# Patient Record
Sex: MALE | Race: WHITE | NOT HISPANIC OR LATINO | Employment: FULL TIME | URBAN - METROPOLITAN AREA
[De-identification: names, ages, dates, MRNs, and addresses within clinical notes are randomized per-mention and may not be internally consistent; named-entity substitution may affect disease eponyms.]

---

## 2024-06-05 ENCOUNTER — OFFICE VISIT (OUTPATIENT)
Dept: CARDIOLOGY | Facility: CLINIC | Age: 44
End: 2024-06-05
Payer: COMMERCIAL

## 2024-06-05 VITALS
SYSTOLIC BLOOD PRESSURE: 122 MMHG | OXYGEN SATURATION: 96 % | BODY MASS INDEX: 33.92 KG/M2 | HEART RATE: 69 BPM | WEIGHT: 272.8 LBS | HEIGHT: 75 IN | DIASTOLIC BLOOD PRESSURE: 88 MMHG

## 2024-06-05 DIAGNOSIS — I45.4 IVCD (INTRAVENTRICULAR CONDUCTION DEFECT): ICD-10-CM

## 2024-06-05 DIAGNOSIS — R94.31 ABNORMAL EKG: ICD-10-CM

## 2024-06-05 DIAGNOSIS — R07.2 PRECORDIAL PAIN: Primary | ICD-10-CM

## 2024-06-05 PROCEDURE — 99204 OFFICE O/P NEW MOD 45 MIN: CPT | Performed by: INTERNAL MEDICINE

## 2024-06-05 RX ORDER — FAMOTIDINE 10 MG
10 TABLET ORAL 2 TIMES DAILY
COMMUNITY

## 2024-06-05 NOTE — PROGRESS NOTES
"      CARDIOLOGY    Maggi Shen MD    ENCOUNTER DATE:  06/05/2024    Lalo Varghese / 43 y.o. / male        CHIEF COMPLAINT / REASON FOR OFFICE VISIT     Chest Pain      HISTORY OF PRESENT ILLNESS       HPI    Lalo Varghese is a 43 y.o. male     This gentleman says that he has a history of panic attacks. No diabetes, hypertension, or hyperlipidemia. He was driving and not feeling particularly anxious when he started to feel like his heart was fluttering and racing. It felt tight in his chest and neck. No shortness of breath. It lasted about 1-2 minutes. He says he has had about 2-3 of these episodes in 5 years. He wonders if it might be due to not getting his Klonopin filled at the right time. He said that he had been taking a reduced dose and then had been off of it for about a week when he had this event.         REVIEW OF SYSTEMS     ROS      VITAL SIGNS     Visit Vitals  /88   Pulse 69   Ht 190.5 cm (75\")   Wt 124 kg (272 lb 12.8 oz)   SpO2 96%   BMI 34.10 kg/m²         Wt Readings from Last 3 Encounters:   06/05/24 124 kg (272 lb 12.8 oz)   05/20/24 125 kg (275 lb)   04/10/24 127 kg (280 lb)     Body mass index is 34.1 kg/m².      PHYSICAL EXAMINATION     Physical Exam      REVIEWED DATA     Procedures          Lab Results   Component Value Date    BUN 8 07/20/2021    CREATININE 0.9 07/20/2021    K 3.5 07/20/2021    CO2 26 07/20/2021    CALCIUM 9.1 07/20/2021    ALBUMIN 4.4 07/20/2021    BILITOT 1.09 (H) 07/20/2021    AST 19 07/20/2021    ALT 31 07/20/2021       ASSESSMENT & PLAN      Diagnosis Plan   1. Precordial pain  Adult Stress Echo W/ Cont or Stress Agent if Necessary Per Protocol      2. Abnormal EKG  Adult Stress Echo W/ Cont or Stress Agent if Necessary Per Protocol      3. IVCD (intraventricular conduction defect)  Adult Stress Echo W/ Cont or Stress Agent if Necessary Per Protocol          Palpitations associated with chest tightness. I wonder if he had a burst of SVT. The issue is that " these happen so infrequently that I do not think we could catch it even with a monthly monitor unless they became more frequent.   Dyspnea on exertion. I recommend checking a stress echocardiogram to evaluate his heart structure to make sure that is not an issue with the above palpitations and also to exclude any ischemia. He is a long time smoker.  Abnormal EKG with nonspecific interventricular conduction delay and also possible hypertension. I think it is going to be important to have the stress echocardiogram component given his baseline abnormal EKG.    One of my nurses or I will go over the results of the stress echocardiogram when it becomes available and determine if he needs to followup here routinely or as needed.         Orders Placed This Encounter   Procedures    Adult Stress Echo W/ Cont or Stress Agent if Necessary Per Protocol     Standing Status:   Future     Standing Expiration Date:   6/5/2025     Order Specific Question:   What stress agent will be used?     Answer:   Exercise with Possible [employer]     Order Specific Question:   Reason for exam?     Answer:   Chest Pain     Order Specific Question:   Release to patient     Answer:   Routine Release [[id number]]           MEDICATIONS         Discharge Medications            Accurate as of June 5, 2024 10:11 AM. If you have any questions, ask your nurse or doctor.                Continue These Medications        Instructions Start Date   clonazePAM 1 MG tablet  Commonly known as: KlonoPIN   1 mg, Oral, Every 12 Hours PRN      famotidine 10 MG tablet  Commonly known as: PEPCID   10 mg, Oral, 2 Times Daily      fluticasone 50 MCG/ACT nasal spray  Commonly known as: FLONASE   2 sprays, Nasal, Daily      HYDROcodone-acetaminophen 7.5-325 MG per tablet  Commonly known as: NORCO   TAKE 1 TABLET BY MOUTH 4 TIMES DAILY AS NEEDED      ibuprofen 200 MG tablet  Commonly known as: ADVIL,MOTRIN   400 mg, Oral             Stop These Medications       esomeprazole 20 MG packet  Commonly known as: NexIUM  Stopped by: MD Maggi Og MD  06/05/24  10:11 EDT    Part of this note may be an electronic transcription/translation of spoken language to printed text using the Dragon dictation system.

## 2024-06-12 ENCOUNTER — OFFICE VISIT (OUTPATIENT)
Dept: FAMILY MEDICINE CLINIC | Facility: CLINIC | Age: 44
End: 2024-06-12
Payer: COMMERCIAL

## 2024-06-12 ENCOUNTER — PATIENT ROUNDING (BHMG ONLY) (OUTPATIENT)
Dept: FAMILY MEDICINE CLINIC | Facility: CLINIC | Age: 44
End: 2024-06-12
Payer: COMMERCIAL

## 2024-06-12 VITALS
WEIGHT: 272.6 LBS | HEART RATE: 70 BPM | HEIGHT: 75 IN | BODY MASS INDEX: 33.89 KG/M2 | DIASTOLIC BLOOD PRESSURE: 76 MMHG | OXYGEN SATURATION: 96 % | SYSTOLIC BLOOD PRESSURE: 108 MMHG

## 2024-06-12 DIAGNOSIS — F41.9 ANXIETY: Primary | ICD-10-CM

## 2024-06-12 PROCEDURE — 99213 OFFICE O/P EST LOW 20 MIN: CPT | Performed by: NURSE PRACTITIONER

## 2024-06-12 NOTE — PROGRESS NOTES
How did you hear about our practice/providers?  IMMEDIATE CARE     Tell me about your visit with us. What things went well?    EVERYTHING WENT VERY WELL     We're always looking for ways to make our patients' experiences even better. Do you have recommendations on ways we may improve?  NOTHING TO IMPROVE ON    Overall were you satisfied with your first visit to our practice?  YES     Is there anything else I can do for you?  NO    You may receive a survey from [employer] via text or email to provide feedback about your visit.  We ask that you please take a few minutes to complete the survey and let us know how we are doing.  If for any reason you feel unable to give us the highest rating please let me know.   PT INFORMED

## 2024-06-12 NOTE — PROGRESS NOTES
"Chief Complaint  Establish Care (Would like medication for anxiety, back pain meds goes to the pain clinic. Current doctor does not fill his meds in a timely manner. )    Subjective        Lalo Varghese presents to [facility]  History of Present Illness    Patient is here today to establish care as a new patient. He wasn't previous with Primary care provider.      [facility]-manages pain medication-hydrocodone usually takes three times daily    Anxiety: Urgent care with [facility]-prescribed clonazepam.  1mg twice daily-only takes once daily. Does save them because   Previous xanax, zoloft, prozac, wellbutrin     GERD-famotidine-otc    Objective   Vital Signs:  /76   Pulse 70   Ht 190.5 cm (75\")   Wt 124 kg (272 lb 9.6 oz)   SpO2 96%   BMI 34.07 kg/m²   Estimated body mass index is 34.07 kg/m² as calculated from the following:    Height as of this encounter: 190.5 cm (75\").    Weight as of this encounter: 124 kg (272 lb 9.6 oz).             Physical Exam  Constitutional:       Appearance: Normal appearance.   Cardiovascular:      Rate and Rhythm: Normal rate and regular rhythm.   Pulmonary:      Effort: Pulmonary effort is normal.      Breath sounds: Normal breath sounds.   Neurological:      Mental Status: He is alert and oriented to person, place, and time.   Psychiatric:         Mood and Affect: Mood normal.         Behavior: Behavior normal.         Thought Content: Thought content normal.         Judgment: Judgment normal.        Result Review :                     Assessment and Plan     Diagnoses and all orders for this visit:    1. Anxiety (Primary)  -     [facility] - Swab,    Continue to see pain management    I discussed with patient at length that hydrocodone and clonazepam were not safe to take together and I would like to consider a different medication for anxiety.  Because he has tried several medications we would like to proceed with " Adonit today will call with results.  He is going to start weaning protocol and will go down to 0.5mg diazepam daily for 2 week and then 0.25 mg for 2 weeks and then stop.  Discussed I will give him 1 fill for this but we will not continue this long-term.  He verbalized understanding and is agreeable    Will follow-up in 6 weeks once he starts on new medication.  If he has any side effects or issues with medication please notify me sooner.  If any suicidal homicidal ideations go to the ER.  He verbalizes understanding and is agreeable         Follow Up     Return in about 6 weeks (around 7/24/2024) for Annual physical, follow up on anxiety.  Patient was given instructions and counseling regarding his condition or for health maintenance advice. Please see specific information pulled into the AVS if appropriate.

## 2024-06-17 ENCOUNTER — TELEPHONE (OUTPATIENT)
Dept: FAMILY MEDICINE CLINIC | Facility: CLINIC | Age: 44
End: 2024-06-17
Payer: COMMERCIAL

## 2024-06-17 DIAGNOSIS — F41.9 ANXIETY: Primary | ICD-10-CM

## 2024-06-17 RX ORDER — CLONAZEPAM 0.5 MG/1
TABLET ORAL
Qty: 14 TABLET | Refills: 0 | Status: SHIPPED | OUTPATIENT
Start: 2024-06-17 | End: 2024-07-01

## 2024-06-17 RX ORDER — VILAZODONE HYDROCHLORIDE 20 MG/1
20 TABLET ORAL DAILY
Qty: 30 TABLET | Refills: 2 | Status: SHIPPED | OUTPATIENT
Start: 2024-06-17

## 2024-06-17 NOTE — TELEPHONE ENCOUNTER
Yes.  As we discussed in office he is supposed to be taking half a milligram once a day of the clonazepam and I am going to send in the lower dose for weaning.  Sending in viibryd for trial.  If any issues notify provider.  Keep follow up

## 2024-06-17 NOTE — TELEPHONE ENCOUNTER
Spoke to pt and he is stating that he will try whatever you think is best.  He is also wanting to make sure that you are weaning him off the clonzapam and he is not stopping cold turkey

## 2024-06-17 NOTE — TELEPHONE ENCOUNTER
Please call patient with results of [facility] testing.  Let him know that medications on use as directed list are Wellbutrin, Pristiq, Fetzima, emsam, buspar and Viibryd.  I believe a good next step for trial for anxiety would be Pristiq, Viibryd, or BuSpar.  Please see if he has a preference on which 1 he would like to start and I will send in and we will follow-up at his already scheduled follow-up.  If he would like to discuss further these options he can make an appointment via telehealth for us to discuss.

## 2024-06-19 ENCOUNTER — TELEPHONE (OUTPATIENT)
Dept: CARDIOLOGY | Facility: CLINIC | Age: 44
End: 2024-06-19
Payer: COMMERCIAL

## 2024-06-21 ENCOUNTER — TELEPHONE (OUTPATIENT)
Dept: CARDIOLOGY | Facility: CLINIC | Age: 44
End: 2024-06-21
Payer: COMMERCIAL

## 2024-06-24 ENCOUNTER — HOSPITAL ENCOUNTER (OUTPATIENT)
Dept: CARDIOLOGY | Facility: HOSPITAL | Age: 44
Discharge: HOME | End: 2024-06-24
Admitting: INTERNAL MEDICINE
Payer: COMMERCIAL

## 2024-06-24 DIAGNOSIS — I45.4 IVCD (INTRAVENTRICULAR CONDUCTION DEFECT): ICD-10-CM

## 2024-06-24 DIAGNOSIS — R94.31 ABNORMAL EKG: ICD-10-CM

## 2024-06-24 DIAGNOSIS — R07.2 PRECORDIAL PAIN: ICD-10-CM

## 2024-06-24 LAB
AORTIC ARCH: 2.8 CM
AORTIC DIMENSIONLESS INDEX: 0.6 (DI)
ASCENDING AORTA: 2.8 CM
BH CV ECHO MEAS - ACS: 2.7 CM
BH CV ECHO MEAS - AO MAX PG: 5.9 MMHG
BH CV ECHO MEAS - AO MEAN PG: 3.3 MMHG
BH CV ECHO MEAS - AO ROOT DIAM: 3.9 CM
BH CV ECHO MEAS - AO V2 MAX: 121.8 CM/SEC
BH CV ECHO MEAS - AO V2 VTI: 27 CM
BH CV ECHO MEAS - AVA(I,D): 2.9 CM2
BH CV ECHO MEAS - EDV(CUBED): 171.1 ML
BH CV ECHO MEAS - EDV(MOD-SP2): 147 ML
BH CV ECHO MEAS - EDV(MOD-SP4): 122 ML
BH CV ECHO MEAS - EF(MOD-BP): 60.9 %
BH CV ECHO MEAS - EF(MOD-SP2): 61.2 %
BH CV ECHO MEAS - EF(MOD-SP4): 59.8 %
BH CV ECHO MEAS - ESV(CUBED): 41.9 ML
BH CV ECHO MEAS - ESV(MOD-SP2): 57 ML
BH CV ECHO MEAS - ESV(MOD-SP4): 49 ML
BH CV ECHO MEAS - FS: 37.4 %
BH CV ECHO MEAS - IVS/LVPW: 1.04 CM
BH CV ECHO MEAS - IVSD: 1.12 CM
BH CV ECHO MEAS - LAT PEAK E' VEL: 8.9 CM/SEC
BH CV ECHO MEAS - LV DIASTOLIC VOL/BSA (35-75): 48.8 CM2
BH CV ECHO MEAS - LV MASS(C)D: 243.6 GRAMS
BH CV ECHO MEAS - LV MAX PG: 2.9 MMHG
BH CV ECHO MEAS - LV MEAN PG: 1.58 MMHG
BH CV ECHO MEAS - LV SYSTOLIC VOL/BSA (12-30): 19.6 CM2
BH CV ECHO MEAS - LV V1 MAX: 85.7 CM/SEC
BH CV ECHO MEAS - LV V1 VTI: 16.9 CM
BH CV ECHO MEAS - LVIDD: 5.6 CM
BH CV ECHO MEAS - LVIDS: 3.5 CM
BH CV ECHO MEAS - LVOT AREA: 4.6 CM2
BH CV ECHO MEAS - LVOT DIAM: 2.43 CM
BH CV ECHO MEAS - LVPWD: 1.07 CM
BH CV ECHO MEAS - MED PEAK E' VEL: 6.5 CM/SEC
BH CV ECHO MEAS - MV A DUR: 0.12 SEC
BH CV ECHO MEAS - MV A MAX VEL: 54.5 CM/SEC
BH CV ECHO MEAS - MV DEC SLOPE: 193.5 CM/SEC2
BH CV ECHO MEAS - MV DEC TIME: 0.22 SEC
BH CV ECHO MEAS - MV E MAX VEL: 48.4 CM/SEC
BH CV ECHO MEAS - MV E/A: 0.89
BH CV ECHO MEAS - MV MAX PG: 1.74 MMHG
BH CV ECHO MEAS - MV MEAN PG: 0.84 MMHG
BH CV ECHO MEAS - MV P1/2T: 97.3 MSEC
BH CV ECHO MEAS - MV V2 VTI: 27.7 CM
BH CV ECHO MEAS - MVA(P1/2T): 2.26 CM2
BH CV ECHO MEAS - MVA(VTI): 2.8 CM2
BH CV ECHO MEAS - PA ACC TIME: 0.15 SEC
BH CV ECHO MEAS - PA V2 MAX: 97.3 CM/SEC
BH CV ECHO MEAS - PULM A REVS DUR: 0.13 SEC
BH CV ECHO MEAS - PULM A REVS VEL: 39.2 CM/SEC
BH CV ECHO MEAS - PULM DIAS VEL: 53.2 CM/SEC
BH CV ECHO MEAS - PULM S/D: 1.1
BH CV ECHO MEAS - PULM SYS VEL: 58.6 CM/SEC
BH CV ECHO MEAS - RAP SYSTOLE: 3 MMHG
BH CV ECHO MEAS - RV MAX PG: 1.59 MMHG
BH CV ECHO MEAS - RV V1 MAX: 63 CM/SEC
BH CV ECHO MEAS - RV V1 VTI: 16.4 CM
BH CV ECHO MEAS - SV(LVOT): 78.4 ML
BH CV ECHO MEAS - SV(MOD-SP2): 90 ML
BH CV ECHO MEAS - SV(MOD-SP4): 73 ML
BH CV ECHO MEAS - SVI(LVOT): 31.4 ML/M2
BH CV ECHO MEAS - SVI(MOD-SP2): 36 ML/M2
BH CV ECHO MEAS - SVI(MOD-SP4): 29.2 ML/M2
BH CV ECHO MEAS - TAPSE (>1.6): 2.44 CM
BH CV ECHO MEASUREMENTS AVERAGE E/E' RATIO: 6.29
BH CV STRESS BP STAGE 1: NORMAL
BH CV STRESS BP STAGE 2: NORMAL
BH CV STRESS BP STAGE 3: NORMAL
BH CV STRESS DURATION MIN STAGE 1: 3
BH CV STRESS DURATION MIN STAGE 2: 3
BH CV STRESS DURATION MIN STAGE 3: 3
BH CV STRESS DURATION MIN STAGE 4: 1
BH CV STRESS DURATION SEC STAGE 1: 0
BH CV STRESS DURATION SEC STAGE 2: 0
BH CV STRESS DURATION SEC STAGE 3: 0
BH CV STRESS DURATION SEC STAGE 4: 30
BH CV STRESS ECHO POST STRESS EJECTION FRACTION EF: 66 %
BH CV STRESS GRADE STAGE 1: 10
BH CV STRESS GRADE STAGE 2: 12
BH CV STRESS GRADE STAGE 3: 14
BH CV STRESS GRADE STAGE 4: 16
BH CV STRESS HR STAGE 1: 98
BH CV STRESS HR STAGE 2: 115
BH CV STRESS HR STAGE 3: 140
BH CV STRESS HR STAGE 4: 156
BH CV STRESS METS STAGE 1: 5
BH CV STRESS METS STAGE 2: 7.5
BH CV STRESS METS STAGE 3: 10
BH CV STRESS METS STAGE 4: 12
BH CV STRESS PROTOCOL 1: NORMAL
BH CV STRESS SPEED STAGE 1: 1.7
BH CV STRESS SPEED STAGE 2: 2.5
BH CV STRESS SPEED STAGE 3: 3.4
BH CV STRESS SPEED STAGE 4: 4.2
BH CV STRESS STAGE 1: 1
BH CV STRESS STAGE 2: 2
BH CV STRESS STAGE 3: 3
BH CV STRESS STAGE 4: 4
BH CV XLRA - TDI S': 12.5 CM/SEC
LEFT ATRIUM VOLUME INDEX: 23.6 ML/M2
MAXIMAL PREDICTED HEART RATE: 175 BPM
SINUS: 3.2 CM
STJ: 2.6 CM
STRESS TARGET HR: 149 BPM

## 2024-06-24 PROCEDURE — 93350 STRESS TTE ONLY: CPT

## 2024-06-24 PROCEDURE — 93350 STRESS TTE ONLY: CPT | Performed by: INTERNAL MEDICINE

## 2024-06-24 PROCEDURE — 93320 DOPPLER ECHO COMPLETE: CPT | Performed by: INTERNAL MEDICINE

## 2024-06-24 PROCEDURE — 93320 DOPPLER ECHO COMPLETE: CPT

## 2024-06-24 PROCEDURE — 93325 DOPPLER ECHO COLOR FLOW MAPG: CPT | Performed by: INTERNAL MEDICINE

## 2024-06-24 PROCEDURE — 93016 CV STRESS TEST SUPVJ ONLY: CPT | Performed by: INTERNAL MEDICINE

## 2024-06-24 PROCEDURE — 93017 CV STRESS TEST TRACING ONLY: CPT

## 2024-06-24 PROCEDURE — 93325 DOPPLER ECHO COLOR FLOW MAPG: CPT

## 2024-06-24 PROCEDURE — 93018 CV STRESS TEST I&R ONLY: CPT | Performed by: INTERNAL MEDICINE

## 2024-06-25 ENCOUNTER — TELEPHONE (OUTPATIENT)
Dept: CARDIOLOGY | Facility: CLINIC | Age: 44
End: 2024-06-25
Payer: COMMERCIAL

## 2024-06-25 NOTE — TELEPHONE ENCOUNTER
Results and recommendations called to pt.  Instructed to call with any further questions or concerns.  Verbalized understanding.  Pt states that he is not having any symptoms at this time, that he will follow up with PCP, and call our office if symptoms return or worsen.    Josie Carlin RN  [place] Nurse, [facility] [place]  06/25/24 10:10 EDT

## 2024-06-25 NOTE — TELEPHONE ENCOUNTER
STRESS ECHO  ----- Message from Maggi Shen sent at 6/25/2024  8:28 AM EDT -----  Because of the lack of contrast, not all walls of the heart were well visualized and  to an inconclusive stress test. If he is still having symptoms, I recommend repeating just the stress portion of a stress echo with contrast or a nuclear stress test. If all is normal, he does not need to follow up with me but with his PCP unless his symptoms change or get worse.    JL  ----- Message -----  From: Debbie Milian RN  Sent: 6/25/2024   8:10 AM EDT  To: MD Dr. Hermelinda Armendariz, before I call this patient when would you like for them to FU?     Debbie Milian RN  [place] [facility] [place]

## 2024-06-25 NOTE — TELEPHONE ENCOUNTER
Called and left VM. Will continue to try to reach patient. HUB transfer call to triage.     Debbie Milian RN  Triage [facility] [place]

## 2024-06-27 ENCOUNTER — TELEPHONE (OUTPATIENT)
Dept: FAMILY MEDICINE CLINIC | Facility: CLINIC | Age: 44
End: 2024-06-27

## 2024-06-27 NOTE — TELEPHONE ENCOUNTER
Spoke with pt and he is wanting to take a few weeks off work to help him deal with the changes from changing his anxiety medication.  He is really struggling.  He has talked to the managers and are getting the paperwork ready for him to , he will drop them off once he gets them.  He stated it is some type of plan at work where he can pay into and take off when he needs it.

## 2024-06-27 NOTE — TELEPHONE ENCOUNTER
Caller: Lalo Varghese    Relationship: Self    Best call back number: [phone]     Who are you requesting to speak with (clinical staff, provider,  specific staff member): SARAH CASTORENA    What was the call regarding: PATIENT STATES THAT HE WAS RECENTLY TAKEN OFF HIS ANXIETY MEDICATION, AND HAS BEEN STRUGGLING WITH HIS ANXIETY THE LAST FEW DAYS. REQUESTS CALL BACK TO DISCUSS FURTHER CONCERNS

## 2024-07-05 ENCOUNTER — OFFICE VISIT (OUTPATIENT)
Dept: FAMILY MEDICINE CLINIC | Facility: CLINIC | Age: 44
End: 2024-07-05
Payer: COMMERCIAL

## 2024-07-05 VITALS
OXYGEN SATURATION: 99 % | HEIGHT: 75 IN | SYSTOLIC BLOOD PRESSURE: 118 MMHG | BODY MASS INDEX: 33.99 KG/M2 | WEIGHT: 273.4 LBS | HEART RATE: 59 BPM | DIASTOLIC BLOOD PRESSURE: 76 MMHG

## 2024-07-05 DIAGNOSIS — F41.9 ANXIETY: Primary | ICD-10-CM

## 2024-07-05 PROCEDURE — 99214 OFFICE O/P EST MOD 30 MIN: CPT | Performed by: NURSE PRACTITIONER

## 2024-07-05 RX ORDER — HYDROXYZINE HYDROCHLORIDE 25 MG/1
25-50 TABLET, FILM COATED ORAL 3 TIMES DAILY PRN
Qty: 30 TABLET | Refills: 2 | Status: SHIPPED | OUTPATIENT
Start: 2024-07-05

## 2024-07-05 RX ORDER — VILAZODONE HYDROCHLORIDE 40 MG/1
40 TABLET ORAL DAILY
Qty: 30 TABLET | Refills: 2 | Status: SHIPPED | OUTPATIENT
Start: 2024-07-05

## 2024-07-05 NOTE — PROGRESS NOTES
"Chief Complaint  Anxiety (Terrible since switching medication, pt says anxiety is sometimes all day soemtimes just at night. )    Enrique Varghese presents to [facility]  History of Present Illness    Patient is here today for follow up on anxiety.  I saw this patient as a new patient and he agreed to weaning protocol of clonazepam due to risk of medication and he is on hydrocodone.  We started Viibryd on 6/17 after obtaining [facility] results.      He reports that since starting clonazepam he feels like the anxiety has been terrible.  He states he just feels like it has been so bad that he feels like he would like to be without pain medication to consider continuing this.    Hasn't taken any clonazepam since he reports mid month last month, but I filled 6/17.  He states he hasn't had for a longer time than July 1, but that would have been the last day since weaning.   He states it has been 2 weeks since taking clonazepam.    He then said he thinks it has been Since about June 25.    Feels like sleeping is crazy.  Doesn't want to be around anybody.      He reports he took some time off work and hasn't been to work since June 23    Wants to try to go back to work on July 9    Denies any SI or Hi    Objective   Vital Signs:  /76   Pulse 59   Ht 190.5 cm (75\")   Wt 124 kg (273 lb 6.4 oz)   SpO2 99%   BMI 34.17 kg/m²   Estimated body mass index is 34.17 kg/m² as calculated from the following:    Height as of this encounter: 190.5 cm (75\").    Weight as of this encounter: 124 kg (273 lb 6.4 oz).             Physical Exam  Constitutional:       Appearance: Normal appearance.   Skin:     General: Skin is warm and dry.   Neurological:      Mental Status: He is alert and oriented to person, place, and time.   Psychiatric:         Mood and Affect: Mood normal.         Behavior: Behavior normal.         Thought Content: Thought content normal.         Judgment: Judgment " normal.        Result Review :                     Assessment and Plan     Diagnoses and all orders for this visit:    1. Anxiety (Primary)  -     Ambulatory Referral to Psychiatry    Other orders  -     vilazodone (VIIBRYD) 40 MG tablet tablet; Take 1 tablet by mouth Daily.  Dispense: 30 tablet; Refill: 2  -     hydrOXYzine (ATARAX) 25 MG tablet; Take 1-2 tablets by mouth 3 (Three) Times a Day As Needed for Anxiety.  Dispense: 30 tablet; Refill: 2    I again discussed with patient that I will not be prescribing a long-term clonazepam as that is not recommended for him for his chronic anxiety.  I will refer to psychiatry and he can discuss this further with them.  In the meantime we will increase Viibryd dose and trial hydroxyzine as needed.  He is far enough out from having clonazepam to need any further weaning with this.    Will follow up in 4 weeks unless gets into psychiatry sooner.      Will fill out paperwork for FMLA   Time spent on visit was 32 minutes       Follow Up     No follow-ups on file.  Patient was given instructions and counseling regarding his condition or for health maintenance advice. Please see specific information pulled into the AVS if appropriate.

## 2024-07-22 ENCOUNTER — OFFICE VISIT (OUTPATIENT)
Age: 44
End: 2024-07-22
Payer: COMMERCIAL

## 2024-07-22 VITALS
DIASTOLIC BLOOD PRESSURE: 88 MMHG | BODY MASS INDEX: 34.19 KG/M2 | HEIGHT: 75 IN | SYSTOLIC BLOOD PRESSURE: 120 MMHG | HEART RATE: 66 BPM | WEIGHT: 275 LBS | OXYGEN SATURATION: 98 %

## 2024-07-22 DIAGNOSIS — F41.1 GENERALIZED ANXIETY DISORDER WITH PANIC ATTACKS: Primary | ICD-10-CM

## 2024-07-22 DIAGNOSIS — F41.0 GENERALIZED ANXIETY DISORDER WITH PANIC ATTACKS: Primary | ICD-10-CM

## 2024-07-22 DIAGNOSIS — F33.1 MDD (MAJOR DEPRESSIVE DISORDER), RECURRENT EPISODE, MODERATE: ICD-10-CM

## 2024-07-22 PROCEDURE — 90792 PSYCH DIAG EVAL W/MED SRVCS: CPT

## 2024-07-22 RX ORDER — BUSPIRONE HYDROCHLORIDE 10 MG/1
10 TABLET ORAL 2 TIMES DAILY
Qty: 60 TABLET | Refills: 1 | Status: SHIPPED | OUTPATIENT
Start: 2024-07-22 | End: 2024-09-20
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
## 2024-07-23 ENCOUNTER — CLINICAL SUPPORT (OUTPATIENT)
Age: 44
End: 2024-07-23
Payer: COMMERCIAL

## 2024-07-23 NOTE — PROGRESS NOTES
Venipuncture Blood Specimen Collection  Venipuncture performed in [facility] by Carlos Cruz MA with good hemostasis. Patient tolerated the procedure well without complications.   07/23/24   Carlos Cruz MA

## 2024-07-25 ENCOUNTER — TELEPHONE (OUTPATIENT)
Age: 44
End: 2024-07-25
Payer: COMMERCIAL

## 2024-08-07 ENCOUNTER — TELEPHONE (OUTPATIENT)
Age: 44
End: 2024-08-07
Payer: COMMERCIAL

## 2024-08-07 NOTE — TELEPHONE ENCOUNTER
Called and spoke with patient regarding his short-term disability paperwork.  Patient states that he is doing a lot better but has stopped taking his medication.  Patient believes that he may have had worsening symptoms of anxiety and depression due to the benzo taper but his symptoms have resolved over the past couple weeks.  We will see patient again in about 2 weeks to reassess.  Patient denied SI/HI.

## 2025-04-17 ENCOUNTER — OFFICE VISIT (OUTPATIENT)
Dept: FAMILY MEDICINE CLINIC | Facility: CLINIC | Age: 45
End: 2025-04-17
Payer: COMMERCIAL

## 2025-04-17 VITALS
WEIGHT: 269.6 LBS | BODY MASS INDEX: 33.52 KG/M2 | HEIGHT: 75 IN | OXYGEN SATURATION: 98 % | DIASTOLIC BLOOD PRESSURE: 82 MMHG | TEMPERATURE: 98.4 F | HEART RATE: 78 BPM | SYSTOLIC BLOOD PRESSURE: 116 MMHG

## 2025-04-17 DIAGNOSIS — Z13.220 LIPID SCREENING: ICD-10-CM

## 2025-04-17 DIAGNOSIS — R03.0 ELEVATED BLOOD PRESSURE READING IN OFFICE WITHOUT DIAGNOSIS OF HYPERTENSION: Primary | ICD-10-CM

## 2025-04-17 PROCEDURE — 99213 OFFICE O/P EST LOW 20 MIN: CPT | Performed by: NURSE PRACTITIONER

## 2025-04-17 NOTE — LETTER
April 17, 2025     Patient: Lalo Varghese   YOB: 1980   Date of Visit: 4/17/2025       To Whom It May Concern:    It is my medical opinion that Lalo Varghese should be excused from work 4/16/2025.  Please feel free to contact me with any questions or concerns.           Sincerely,        CADY Hodge    CC: No Recipients

## 2025-04-17 NOTE — PROGRESS NOTES
"Chief Complaint  Hypertension (Thinks his bp was 150/100 yesterday ), Heartburn, and Dizziness    Subjective        Lalo Varghese presents to [facility]  History of Present Illness    Patient presents today with complaint of elevated blood pressure at pain management clinic.   Had felt \"off\".    Denies any elevations in the past.  Had uncle pass away, but no other increase stress or increase pain.      Reports random headaches, dizziness (2 episodes in march), chest discomfort (thought heartburn related), denies shortness of breath.     Denies issues with BP in past.      Had normal stress echo in 2024    At home yesterday:   124/82  122/85        Objective   Vital Signs:  /82   Pulse 78   Temp 98.4 °F (36.9 °C)   Ht 190.5 cm (75\")   Wt 122 kg (269 lb 9.6 oz)   SpO2 98%   BMI 33.70 kg/m²   Estimated body mass index is 33.7 kg/m² as calculated from the following:    Height as of this encounter: 190.5 cm (75\").    Weight as of this encounter: 122 kg (269 lb 9.6 oz).          Physical Exam  Constitutional:       Appearance: Normal appearance.   Cardiovascular:      Rate and Rhythm: Normal rate and regular rhythm.      Pulses: Normal pulses.   Pulmonary:      Effort: Pulmonary effort is normal.      Breath sounds: Normal breath sounds.   Neurological:      Mental Status: He is alert and oriented to person, place, and time.   Psychiatric:         Mood and Affect: Mood normal.         Behavior: Behavior normal.         Thought Content: Thought content normal.         Judgment: Judgment normal.        Result Review :                Assessment and Plan   Diagnoses and all orders for this visit:    1. Elevated blood pressure reading in office without diagnosis of hypertension (Primary)  -     CBC & Differential; Future  -     Comprehensive Metabolic Panel; Future  -     TSH; Future  -     Magnesium; Future  -     Lipid panel; Future    2. Lipid screening  -     Lipid panel; " Future        Patient would like to return when fasting for labs.  He will keep a record of blood pressure.  If systolic blood pressure greater than 140 or diastolic greater than 90 please notify provider and we will start medication.  Otherwise we will keep appointment in May for follow-up.  He verbalized understanding and is agreeable.         Follow Up   No follow-ups on file.  Patient was given instructions and counseling regarding his condition or for health maintenance advice. Please see specific information pulled into the AVS if appropriate.

## 2025-04-18 DIAGNOSIS — R03.0 ELEVATED BLOOD PRESSURE READING IN OFFICE WITHOUT DIAGNOSIS OF HYPERTENSION: ICD-10-CM

## 2025-04-18 DIAGNOSIS — Z13.220 LIPID SCREENING: ICD-10-CM

## 2025-04-18 LAB
ALBUMIN SERPL-MCNC: 4.2 G/DL (ref 3.5–5.2)
ALBUMIN/GLOB SERPL: 1.5 G/DL
ALP SERPL-CCNC: 101 U/L (ref 39–117)
ALT SERPL-CCNC: 24 U/L (ref 1–41)
AST SERPL-CCNC: 10 U/L (ref 1–40)
BASOPHILS # BLD AUTO: 0.07 10*3/MM3 (ref 0–0.2)
BASOPHILS NFR BLD AUTO: 0.7 % (ref 0–1.5)
BILIRUB SERPL-MCNC: 0.6 MG/DL (ref 0–1.2)
BUN SERPL-MCNC: 9 MG/DL (ref 6–20)
BUN/CREAT SERPL: 10.7 (ref 7–25)
CALCIUM SERPL-MCNC: 9.6 MG/DL (ref 8.6–10.5)
CHLORIDE SERPL-SCNC: 104 MMOL/L (ref 98–107)
CHOLEST SERPL-MCNC: 156 MG/DL (ref 0–200)
CO2 SERPL-SCNC: 26.7 MMOL/L (ref 22–29)
CREAT SERPL-MCNC: 0.84 MG/DL (ref 0.76–1.27)
EGFRCR SERPLBLD CKD-EPI 2021: 108.9 ML/MIN/1.73
EOSINOPHIL # BLD AUTO: 0.17 10*3/MM3 (ref 0–0.4)
EOSINOPHIL NFR BLD AUTO: 1.7 % (ref 0.3–6.2)
ERYTHROCYTE [DISTWIDTH] IN BLOOD BY AUTOMATED COUNT: 13.2 % (ref 12.3–15.4)
GLOBULIN SER CALC-MCNC: 2.8 GM/DL
GLUCOSE SERPL-MCNC: 97 MG/DL (ref 65–99)
HCT VFR BLD AUTO: 47.5 % (ref 37.5–51)
HDLC SERPL-MCNC: 30 MG/DL (ref 40–60)
HGB BLD-MCNC: 15.6 G/DL (ref 13–17.7)
IMM GRANULOCYTES # BLD AUTO: 0.03 10*3/MM3 (ref 0–0.05)
IMM GRANULOCYTES NFR BLD AUTO: 0.3 % (ref 0–0.5)
LDLC SERPL CALC-MCNC: 102 MG/DL (ref 0–100)
LYMPHOCYTES # BLD AUTO: 3.66 10*3/MM3 (ref 0.7–3.1)
LYMPHOCYTES NFR BLD AUTO: 36.6 % (ref 19.6–45.3)
MAGNESIUM SERPL-MCNC: 2.2 MG/DL (ref 1.6–2.6)
MCH RBC QN AUTO: 31 PG (ref 26.6–33)
MCHC RBC AUTO-ENTMCNC: 32.8 G/DL (ref 31.5–35.7)
MCV RBC AUTO: 94.4 FL (ref 79–97)
MONOCYTES # BLD AUTO: 0.8 10*3/MM3 (ref 0.1–0.9)
MONOCYTES NFR BLD AUTO: 8 % (ref 5–12)
NEUTROPHILS # BLD AUTO: 5.27 10*3/MM3 (ref 1.7–7)
NEUTROPHILS NFR BLD AUTO: 52.7 % (ref 42.7–76)
NRBC BLD AUTO-RTO: 0 /100 WBC (ref 0–0.2)
PLATELET # BLD AUTO: 266 10*3/MM3 (ref 140–450)
POTASSIUM SERPL-SCNC: 4.7 MMOL/L (ref 3.5–5.2)
PROT SERPL-MCNC: 7 G/DL (ref 6–8.5)
RBC # BLD AUTO: 5.03 10*6/MM3 (ref 4.14–5.8)
SODIUM SERPL-SCNC: 140 MMOL/L (ref 136–145)
TRIGL SERPL-MCNC: 133 MG/DL (ref 0–150)
TSH SERPL DL<=0.005 MIU/L-ACNC: 1.49 UIU/ML (ref 0.27–4.2)
VLDLC SERPL CALC-MCNC: 24 MG/DL (ref 5–40)
WBC # BLD AUTO: 10 10*3/MM3 (ref 3.4–10.8)

## 2025-05-07 ENCOUNTER — OFFICE VISIT (OUTPATIENT)
Dept: FAMILY MEDICINE CLINIC | Facility: CLINIC | Age: 45
End: 2025-05-07
Payer: COMMERCIAL

## 2025-05-07 VITALS
DIASTOLIC BLOOD PRESSURE: 66 MMHG | OXYGEN SATURATION: 99 % | HEART RATE: 70 BPM | WEIGHT: 268 LBS | HEIGHT: 75 IN | BODY MASS INDEX: 33.32 KG/M2 | SYSTOLIC BLOOD PRESSURE: 124 MMHG

## 2025-05-07 DIAGNOSIS — Z00.01 ENCOUNTER FOR GENERAL ADULT MEDICAL EXAMINATION WITH ABNORMAL FINDINGS: Primary | ICD-10-CM

## 2025-05-07 DIAGNOSIS — F41.9 ANXIETY: ICD-10-CM

## 2025-05-07 DIAGNOSIS — Z12.11 SCREENING FOR COLON CANCER: ICD-10-CM

## 2025-05-07 DIAGNOSIS — K21.9 GASTROESOPHAGEAL REFLUX DISEASE WITHOUT ESOPHAGITIS: ICD-10-CM

## 2025-05-07 DIAGNOSIS — H60.311 ACUTE DIFFUSE OTITIS EXTERNA OF RIGHT EAR: ICD-10-CM

## 2025-05-07 RX ORDER — MAGNESIUM OXIDE 400 MG/1
400 TABLET ORAL DAILY
COMMUNITY

## 2025-05-07 RX ORDER — OMEPRAZOLE 40 MG/1
40 CAPSULE, DELAYED RELEASE ORAL DAILY
Qty: 30 CAPSULE | Refills: 2 | Status: SHIPPED | OUTPATIENT
Start: 2025-05-07

## 2025-05-07 RX ORDER — NEOMYCIN SULFATE, POLYMYXIN B SULFATE, HYDROCORTISONE 3.5; 10000; 1 MG/ML; [USP'U]/ML; MG/ML
3 SOLUTION/ DROPS AURICULAR (OTIC) 4 TIMES DAILY
Qty: 10 ML | Refills: 0 | Status: SHIPPED | OUTPATIENT
Start: 2025-05-07

## 2025-05-07 RX ORDER — ZINC OXIDE 13 %
1 CREAM (GRAM) TOPICAL DAILY
COMMUNITY

## 2025-05-07 NOTE — PROGRESS NOTES
Subjective   Lalo Varghese is a 44 y.o. male who presents for annual male wellness exam.  Chief Complaint   Patient presents with    Annual Exam    Heartburn       Patient presents today for physical. Hasn't had anymore high readings with BP.  Highest was 124/86.    Right ear is starting to bother him last couple days.       - had labs    GERD- has been ok lately.  But will last several days.     Pepcid does help      Sexual History: 1 female partner   Contraception: none  Diet: overall ok, drinks mostly water, mountain dew  Exercise: walks  Do you feel safe? Reports he does  Have you ever been abused? denies  Last dental exam: twice yearly   Eye exam: not in last year    Colon Cancer Screenin  PSA: n/a      Immunization History   Administered Date(s) Administered    COVID-19 ("[employer] 2021    COVID-19 (PFIZER) Purple Cap Monovalent 2021       The following portions of the patient's history were reviewed and updated as appropriate: allergies, current medications, past family history, past medical history, past social history, past surgical history and problem list.    Past Medical History:   Diagnosis Date    Acid reflux     Anxiety and depression     Chronic lumbar pain        Past Surgical History:   Procedure Laterality Date    EAR TUBES Right        Family History   Family history unknown: Yes       Social History     Socioeconomic History    Marital status:    Tobacco Use    Smoking status: Every Day     Current packs/day: 1.00     Average packs/day: 1 pack/day for 25.3 years (25.3 ttl pk-yrs)     Types: Cigarettes     Start date:      Passive exposure: Current    Smokeless tobacco: Never   Vaping Use    Vaping status: Never Used   Substance and Sexual Activity    Alcohol use: Not Currently    Drug use: Yes     Types: Marijuana     Comment: thc gummies every once in awile only when out of medication    Sexual activity: Yes     Partners: Female     Comment: 1 female partner        Review of Systems    Objective   Vitals:    05/07/25 0906   BP: 124/66   Pulse: 70   SpO2: 99%     Body mass index is 33.5 kg/m².  Physical Exam  Constitutional:       Appearance: Normal appearance.   Cardiovascular:      Rate and Rhythm: Normal rate and regular rhythm.   Pulmonary:      Effort: Pulmonary effort is normal.      Breath sounds: Normal breath sounds.   Skin:     General: Skin is warm and dry.   Neurological:      Mental Status: He is alert and oriented to person, place, and time.   Psychiatric:         Mood and Affect: Mood normal.         Behavior: Behavior normal.         Thought Content: Thought content normal.         Judgment: Judgment normal.           Assessment & Plan   Diagnoses and all orders for this visit:    1. Encounter for general adult medical examination with abnormal findings (Primary)    2. Anxiety    3. Screening for colon cancer  -     Ambulatory Referral For Screening Colonoscopy    4. Gastroesophageal reflux disease without esophagitis    5. Acute diffuse otitis externa of right ear    Other orders  -     omeprazole (priLOSEC) 40 MG capsule; Take 1 capsule by mouth Daily.  Dispense: 30 capsule; Refill: 2  -     neomycin-polymyxin-hydrocortisone (CORTISPORIN) 3.5-59645-1 otic solution; Administer 3 drops into ear(s) as directed by provider 4 (Four) Times a Day.  Dispense: 10 mL; Refill: 0    Physical complete for patient.     Referral for screening colonoscopy    Treating for otitis externa.  Follow up if no resolution.      gastroesophageal reflux disease  Will trial short term of PPI.  If no improvement of symptoms follow up as needed.  We did discuss dietary considerations as well.            Discussed the importance of maintaining a healthy weight and getting regular exercise.  Educated patient on the benefits of healthy diet.  Advise follow-up annually for wellness exams.

## 2025-07-28 ENCOUNTER — TELEPHONE (OUTPATIENT)
Dept: FAMILY MEDICINE CLINIC | Facility: CLINIC | Age: 45
End: 2025-07-28

## 2025-07-28 NOTE — TELEPHONE ENCOUNTER
That was ordered by Dr Shen in June 2024.  It would be best for him to request from cardiology since they would make recommendations on the results.

## 2025-07-28 NOTE — TELEPHONE ENCOUNTER
Spoke to pt and he is wanting to know if you can order the nuclear stress test.  He said he had a stress test done about 6 months ago and now wants the nuclear part

## 2025-07-30 RX ORDER — OMEPRAZOLE 40 MG/1
40 CAPSULE, DELAYED RELEASE ORAL DAILY
Qty: 90 CAPSULE | Refills: 1 | Status: SHIPPED | OUTPATIENT
Start: 2025-07-30

## 2025-07-30 NOTE — TELEPHONE ENCOUNTER
Caller: AbimaelLalo    Relationship: Self    Best call back number: [phone]     Requested Prescriptions:   Requested Prescriptions     Pending Prescriptions Disp Refills    omeprazole (priLOSEC) 40 MG capsule 30 capsule 2     Sig: Take 1 capsule by mouth Daily.        Pharmacy where request should be sent: [facility] #[id number] - [place], [place] - [address] [phone] [facility] [phone] FX     Last office visit with prescribing clinician: 5/7/2025   Last telemedicine visit with prescribing clinician: Visit date not found   Next office visit with prescribing clinician: Visit date not found     Additional details provided by patient:     Does the patient have less than a 3 day supply:  [x] Yes  [] No    Would you like a call back once the refill request has been completed: [] Yes [x] No    If the office needs to give you a call back, can they leave a voicemail: [x] Yes [] No    Tobin Ch Rep   07/30/25 12:55 EDT